# Patient Record
Sex: FEMALE | Race: WHITE | NOT HISPANIC OR LATINO | Employment: FULL TIME | URBAN - METROPOLITAN AREA
[De-identification: names, ages, dates, MRNs, and addresses within clinical notes are randomized per-mention and may not be internally consistent; named-entity substitution may affect disease eponyms.]

---

## 2017-03-10 ENCOUNTER — HOSPITAL ENCOUNTER (OUTPATIENT)
Dept: ORTHOPEDIC SURGERY | Facility: CLINIC | Age: 57
Discharge: HOME | End: 2017-03-10
Attending: PHYSICIAN ASSISTANT | Admitting: PHYSICIAN ASSISTANT

## 2017-03-15 ENCOUNTER — HOSPITAL ENCOUNTER (OUTPATIENT)
Dept: PHYSICAL THERAPY | Facility: HOSPITAL | Age: 57
Setting detail: RECURRING SERIES
Discharge: HOME | End: 2017-04-18
Attending: PHYSICIAN ASSISTANT | Admitting: PHYSICIAN ASSISTANT

## 2018-05-04 ENCOUNTER — HOSPITAL ENCOUNTER (OUTPATIENT)
Dept: MAMMOGRAPHY | Facility: HOSPITAL | Age: 58
Discharge: HOME | End: 2018-05-04
Attending: INTERNAL MEDICINE | Admitting: INTERNAL MEDICINE

## 2020-01-10 ENCOUNTER — TRANSCRIBE ORDERS (OUTPATIENT)
Dept: ADMINISTRATIVE | Facility: HOSPITAL | Age: 60
End: 2020-01-10

## 2020-01-10 DIAGNOSIS — Z12.39 SCREENING BREAST EXAMINATION: Primary | ICD-10-CM

## 2020-01-24 ENCOUNTER — APPOINTMENT (OUTPATIENT)
Dept: MAMMOGRAPHY | Facility: HOSPITAL | Age: 60
End: 2020-01-24

## 2020-01-28 ENCOUNTER — OFFICE VISIT (OUTPATIENT)
Dept: SLEEP MEDICINE | Facility: CLINIC | Age: 60
End: 2020-01-28

## 2020-01-28 VITALS
SYSTOLIC BLOOD PRESSURE: 122 MMHG | OXYGEN SATURATION: 99 % | HEART RATE: 76 BPM | WEIGHT: 190 LBS | DIASTOLIC BLOOD PRESSURE: 89 MMHG | HEIGHT: 68 IN | BODY MASS INDEX: 28.79 KG/M2

## 2020-01-28 DIAGNOSIS — R06.83 SNORING: ICD-10-CM

## 2020-01-28 DIAGNOSIS — G47.30 OBSERVED SLEEP APNEA: Primary | ICD-10-CM

## 2020-01-28 DIAGNOSIS — G47.10 HYPERSOMNIA: ICD-10-CM

## 2020-01-28 PROCEDURE — G0463 HOSPITAL OUTPT CLINIC VISIT: HCPCS

## 2020-01-28 PROCEDURE — 99244 OFF/OP CNSLTJ NEW/EST MOD 40: CPT | Performed by: INTERNAL MEDICINE

## 2020-01-28 NOTE — PROGRESS NOTES
[facility]  [address], [place] [address]  Phone   Fax       Michelle Aparicio  1960  59 y.o.  female      Referring Provider and PCP Nery Whitten MD    Type of service: Initial consult  Date of service: 1/28/2020      Chief Complaint   Patient presents with   • Witnessed Apnea   • Snoring   • Daytime Sleepiness   • Fatigue       History of present illness;  Thank you for asking to see Michelle Aparicio, 59 y.o.  for evaluation of sleep apnea. The patient was seen today on 1/28/2020 at [facility].   He was in his senses marrow and feels tired and exhausted.  At work she feels sleepy and in the meetings is very hard for her to be awake.  She is tired and exhausted.    Patient gives the following sleep history.  Sleep schedule:  Bedtime: 8:30 PM  Wake time: 3 AM  Normally takes about 10 minutes to fall asleep  Average hours of sleep 6  Number of naps per day yes  When patient wakes up still feels tired and not rested  Snoring; yes  Witnessed apneas; yes  Have you ever awakened gasping for breath, coughing, choking: Yes      Past Medical History:   Diagnosis Date   • Back pain 03/10/2017    low-area   • Disease of thyroid gland    • Fibromyalgia    • Lumbar and sacral spondylarthritis 03/10/2017    acquired    • Spondylosis 03/10/2017    lumbar with radiculopathy       Social history:  Shift work: No  Tobacco use: She does vaping  Alcohol use: No  Caffeinated drinks: 1-2  Over-the-counter sleeping aid and medications: No  Narcotic medications: No    Family Hx  Family history of sleep apnea, no  Family History   Problem Relation Age of Onset   • Deep vein thrombosis Mother    • Snoring Mother    • Heart disease Father    • Restless legs syndrome Father    • Snoring Father        Medications: reviewed    Current Outpatient Medications:   •  levothyroxine (SYNTHROID, LEVOTHROID) 88 MCG tablet, Take 88 mcg by mouth Daily. 1 po qd, Disp: , Rfl:   •   "diclofenac-misoprostol (ARTHROTEC 75) 75-0.2 MG EC tablet, Take 1 tablet by mouth 2 (Two) Times a Day. Delayed Release, Disp: , Rfl:   •  Naproxen-Esomeprazole (VIMOVO) 500-20 MG tablet delayed-release, Take  by mouth 2 (Two) Times a Day., Disp: , Rfl:     Review of systems:  [place] Sleepiness Scale: Total score: 18   Positive for snoring, witnessed apneas, fatigue and daytime excessive sleepiness,   Negative for shortness of breath, cough, wheezing, chest pain, nausea and vomiting, palpitation, swelling of feet:    Morning headaches: Yes  Awaken with sore throat or dry mouth : Yes  Leg jerking at night: No  Crawly feeling/urge sensation to move in the legs: No  Teeth grinding: No  Sleepwalking, nightmares, muscle weakness with laughing or anger,sleep paralysis: No  Nasal Congestion: No  Nocturia (how many times/night): No  Memory Problem: No  Change in weight, no    Physical exam:  Vitals:    01/28/20 0931   BP: 122/89   Pulse: 76   SpO2: 99%   Weight: 86.2 kg (190 lb)   Height: 172.7 cm (68\")    Body mass index is 28.89 kg/m². Neck Circumference: 14 inches  HEENT: Head is atraumatic, normocephalic   Eyes:pupils are round equal and reacting to light and accommodation, conjunctiva normal  Nose:no nasal septal defects or deviation and the nasal passages are clear, no nasal polyps,   Throat: tonsils are not enlarged, tongue normal size, oral airway Mallampati class 3  NECK: No lymphadenopathy, trachea is in the midline, thyroid not enlarged  RESPIRATORY SYSTEM: Breath sounds are equal on both sides, there are no wheezes or crackles  CARDIOVASULAR SYSTEM: Heart sounds are regular rhythm and jose rate, there are no murmurs or thrills  ABDOMEN: Soft, no hepatosplenomegaly, no evidence of ascites  EXTREMITES: No cyanosis, clubbing or edema   NEUROLOGICAL SYSTEM: Oriented x 3, no gross neurological defects, gait normal    Medical records and labs reviewed in Epic thyroid function normal    Assessment and plan:  · Sleep " apnea unspecified, (G47.30) Patient's symptoms and examination is consistent with sleep apnea.  I have talked to the patient about the signs and symptoms of sleep apnea and consequences of untreated sleep apnea. Discussed sleep testing.  I have placed a order in epic for a home sleep test.  Patient will have a follow-up after this sleep test is done.   · Overweight, patient's BMI is Body mass index is 28.89 kg/m².. I have discussed the relationship between weight and sleep apnea.There is direct correction between weight and severity of sleep apnea.  Weight reduction is encouraged. I have also discussed with the patient diet and exercise.  · Snoring secondary to sleep apnea  · Hypersomnia with [place] Sleepiness Scale of Total score: 18 due to sleep apnea.  · Insufficient sleep syndrome.  I feel that she is not getting enough sleep and average of 6 hours per night.  I have talked to the patient about increasing sleep time.      I once again thank you for asking me to see this patient in consultation and I have forwarded my opinion and treatment plan.  Please do not hesitate to call me if you have any questions.     Roger Caldwell MD, [facility]  Sleep Medicine.(Board-certified)  [facility]  Medical Director for [facility]  1/28/2020 ,

## 2020-02-28 ENCOUNTER — HOSPITAL ENCOUNTER (OUTPATIENT)
Dept: SLEEP MEDICINE | Facility: HOSPITAL | Age: 60
Discharge: HOME | End: 2020-02-28

## 2020-02-28 VITALS — HEART RATE: 77 BPM | RESPIRATION RATE: 14 BRPM | OXYGEN SATURATION: 93 %

## 2020-02-28 DIAGNOSIS — R06.83 SNORING: ICD-10-CM

## 2020-02-28 DIAGNOSIS — G47.30 OBSERVED SLEEP APNEA: ICD-10-CM

## 2020-02-28 DIAGNOSIS — G47.10 HYPERSOMNIA: ICD-10-CM

## 2020-02-28 PROCEDURE — 95806 SLEEP STUDY UNATT&RESP EFFT: CPT

## 2020-03-03 DIAGNOSIS — G47.10 HYPERSOMNIA: ICD-10-CM

## 2020-03-03 DIAGNOSIS — R06.83 SNORING: ICD-10-CM

## 2020-03-03 DIAGNOSIS — G47.30 OBSERVED SLEEP APNEA: Primary | ICD-10-CM

## 2020-05-15 ENCOUNTER — HOSPITAL ENCOUNTER (OUTPATIENT)
Dept: SLEEP MEDICINE | Facility: HOSPITAL | Age: 60
Discharge: HOME | End: 2020-05-15
Admitting: INTERNAL MEDICINE

## 2020-05-15 VITALS — BODY MASS INDEX: 28.8 KG/M2 | WEIGHT: 190.04 LBS | HEIGHT: 68 IN

## 2020-05-15 DIAGNOSIS — R06.83 SNORING: ICD-10-CM

## 2020-05-15 DIAGNOSIS — G47.30 OBSERVED SLEEP APNEA: ICD-10-CM

## 2020-05-15 DIAGNOSIS — G47.10 HYPERSOMNIA: ICD-10-CM

## 2020-05-15 PROCEDURE — 95810 POLYSOM 6/> YRS 4/> PARAM: CPT | Performed by: INTERNAL MEDICINE

## 2020-05-15 PROCEDURE — 95810 POLYSOM 6/> YRS 4/> PARAM: CPT

## 2020-05-19 DIAGNOSIS — G47.33 OSA (OBSTRUCTIVE SLEEP APNEA): Primary | ICD-10-CM

## 2020-05-19 DIAGNOSIS — R06.83 SNORING: ICD-10-CM

## 2020-09-19 ENCOUNTER — HOSPITAL ENCOUNTER (EMERGENCY)
Facility: HOSPITAL | Age: 60
Discharge: HOME | End: 2020-09-19
Attending: EMERGENCY MEDICINE | Admitting: EMERGENCY MEDICINE

## 2020-09-19 ENCOUNTER — APPOINTMENT (OUTPATIENT)
Dept: GENERAL RADIOLOGY | Facility: HOSPITAL | Age: 60
End: 2020-09-19

## 2020-09-19 VITALS
SYSTOLIC BLOOD PRESSURE: 119 MMHG | WEIGHT: 201.06 LBS | RESPIRATION RATE: 18 BRPM | TEMPERATURE: 100.3 F | BODY MASS INDEX: 30.47 KG/M2 | HEART RATE: 96 BPM | HEIGHT: 68 IN | DIASTOLIC BLOOD PRESSURE: 74 MMHG | OXYGEN SATURATION: 97 %

## 2020-09-19 DIAGNOSIS — R06.00 DYSPNEA, UNSPECIFIED TYPE: ICD-10-CM

## 2020-09-19 DIAGNOSIS — U07.1 COVID-19 VIRUS INFECTION: Primary | ICD-10-CM

## 2020-09-19 LAB
ANION GAP SERPL CALCULATED.3IONS-SCNC: 9 MMOL/L (ref 5–15)
APTT PPP: 27.1 SECONDS (ref 24–31)
BASOPHILS # BLD AUTO: 0 10*3/MM3 (ref 0–0.2)
BASOPHILS NFR BLD AUTO: 0.5 % (ref 0–1.5)
BUN SERPL-MCNC: 15 MG/DL (ref 6–20)
BUN SERPL-MCNC: ABNORMAL MG/DL
BUN/CREAT SERPL: ABNORMAL
CALCIUM SPEC-SCNC: 8.8 MG/DL (ref 8.6–10.5)
CHLORIDE SERPL-SCNC: 102 MMOL/L (ref 98–107)
CO2 SERPL-SCNC: 22 MMOL/L (ref 22–29)
CREAT SERPL-MCNC: 1.2 MG/DL (ref 0.57–1)
DEPRECATED RDW RBC AUTO: 47.3 FL (ref 37–54)
EOSINOPHIL # BLD AUTO: 0 10*3/MM3 (ref 0–0.4)
EOSINOPHIL NFR BLD AUTO: 0.3 % (ref 0.3–6.2)
ERYTHROCYTE [DISTWIDTH] IN BLOOD BY AUTOMATED COUNT: 14.7 % (ref 12.3–15.4)
GFR SERPL CREATININE-BSD FRML MDRD: 46 ML/MIN/1.73
GLUCOSE SERPL-MCNC: 111 MG/DL (ref 65–99)
HCT VFR BLD AUTO: 42.2 % (ref 34–46.6)
HGB BLD-MCNC: 13.7 G/DL (ref 12–15.9)
INR PPP: <0.93 (ref 0.93–1.1)
LYMPHOCYTES # BLD AUTO: 1.7 10*3/MM3 (ref 0.7–3.1)
LYMPHOCYTES NFR BLD AUTO: 29.8 % (ref 19.6–45.3)
MCH RBC QN AUTO: 29.5 PG (ref 26.6–33)
MCHC RBC AUTO-ENTMCNC: 32.5 G/DL (ref 31.5–35.7)
MCV RBC AUTO: 90.7 FL (ref 79–97)
MONOCYTES # BLD AUTO: 0.4 10*3/MM3 (ref 0.1–0.9)
MONOCYTES NFR BLD AUTO: 6.5 % (ref 5–12)
NEUTROPHILS NFR BLD AUTO: 3.6 10*3/MM3 (ref 1.7–7)
NEUTROPHILS NFR BLD AUTO: 62.9 % (ref 42.7–76)
NRBC BLD AUTO-RTO: 0 /100 WBC (ref 0–0.2)
NT-PROBNP SERPL-MCNC: 89.4 PG/ML (ref 0–900)
PLATELET # BLD AUTO: 136 10*3/MM3 (ref 140–450)
PMV BLD AUTO: 7.1 FL (ref 6–12)
POTASSIUM SERPL-SCNC: 4.5 MMOL/L (ref 3.5–5.2)
PROTHROMBIN TIME: 9.8 SECONDS (ref 9.6–11.7)
RBC # BLD AUTO: 4.66 10*6/MM3 (ref 3.77–5.28)
SARS-COV-2 RNA PNL SPEC NAA+PROBE: DETECTED
SODIUM SERPL-SCNC: 133 MMOL/L (ref 136–145)
TROPONIN T SERPL-MCNC: <0.01 NG/ML (ref 0–0.03)
WBC # BLD AUTO: 5.7 10*3/MM3 (ref 3.4–10.8)

## 2020-09-19 PROCEDURE — 85025 COMPLETE CBC W/AUTO DIFF WBC: CPT | Performed by: EMERGENCY MEDICINE

## 2020-09-19 PROCEDURE — 71045 X-RAY EXAM CHEST 1 VIEW: CPT

## 2020-09-19 PROCEDURE — 99284 EMERGENCY DEPT VISIT MOD MDM: CPT

## 2020-09-19 PROCEDURE — 83880 ASSAY OF NATRIURETIC PEPTIDE: CPT | Performed by: EMERGENCY MEDICINE

## 2020-09-19 PROCEDURE — 87635 SARS-COV-2 COVID-19 AMP PRB: CPT | Performed by: EMERGENCY MEDICINE

## 2020-09-19 PROCEDURE — 80048 BASIC METABOLIC PNL TOTAL CA: CPT | Performed by: EMERGENCY MEDICINE

## 2020-09-19 PROCEDURE — 84484 ASSAY OF TROPONIN QUANT: CPT | Performed by: EMERGENCY MEDICINE

## 2020-09-19 PROCEDURE — 93005 ELECTROCARDIOGRAM TRACING: CPT | Performed by: EMERGENCY MEDICINE

## 2020-09-19 PROCEDURE — 36415 COLL VENOUS BLD VENIPUNCTURE: CPT

## 2020-09-19 PROCEDURE — 85730 THROMBOPLASTIN TIME PARTIAL: CPT | Performed by: EMERGENCY MEDICINE

## 2020-09-19 PROCEDURE — 85610 PROTHROMBIN TIME: CPT | Performed by: EMERGENCY MEDICINE

## 2020-09-19 RX ORDER — SODIUM CHLORIDE 0.9 % (FLUSH) 0.9 %
10 SYRINGE (ML) INJECTION AS NEEDED
Status: DISCONTINUED | OUTPATIENT
Start: 2020-09-19 | End: 2020-09-19 | Stop reason: HOSPADM

## 2020-09-19 NOTE — ED NOTES
Pt c/o SOA, dizziness, hot/cold chills, mild cough x2 days; states parents and sibling have covid.     Ana Martinez RN  09/19/20 4790

## 2020-09-19 NOTE — ED PROVIDER NOTES
Subjective   Chief complaint: Shortness of breath    60-year-old female presents with shortness of breath.  Patient states symptoms have been getting progressively worse over the past 2 days.  She has had subjective fevers and chills.  She has had a nonproductive cough.  She has had general weakness and fatigue as well as body aches.  She denies any alleviating or exacerbating factors.  She has had multiple family members who have tested positive for COVID.      History provided by:  Patient      Review of Systems   Constitutional: Positive for chills, fatigue and fever.   HENT: Positive for congestion. Negative for sore throat.    Eyes: Negative for redness.   Respiratory: Positive for cough and shortness of breath.    Cardiovascular: Negative for chest pain.   Gastrointestinal: Positive for diarrhea and nausea. Negative for abdominal pain and vomiting.   Genitourinary: Negative for dysuria.   Musculoskeletal: Negative for back pain.   Skin: Negative for rash.   Neurological: Negative for dizziness and headaches.   Psychiatric/Behavioral: Negative for confusion.       Past Medical History:   Diagnosis Date   • Back pain 03/10/2017    low-area   • Disease of thyroid gland    • Fibromyalgia    • Lumbar and sacral spondylarthritis 03/10/2017    acquired    • Spondylosis 03/10/2017    lumbar with radiculopathy       Allergies   Allergen Reactions   • Stadol [Butorphanol] Hives       Past Surgical History:   Procedure Laterality Date   •  SECTION     • GALLBLADDER SURGERY     • HYSTERECTOMY     • LAPAROSCOPIC GASTRIC BANDING     • STOMACH SURGERY      Tummy Tuck   • THYROID SURGERY         Family History   Problem Relation Age of Onset   • Deep vein thrombosis Mother    • Snoring Mother    • Heart disease Father    • Restless legs syndrome Father    • Snoring Father        Social History     Socioeconomic History   • Marital status: Single     Spouse name: Not on file   • Number of  "children: Not on file   • Years of education: Not on file   • Highest education level: Not on file       /74 (BP Location: Right arm, Patient Position: Lying)   Pulse 96   Temp 100.3 °F (37.9 °C) (Oral)   Resp 18   Ht 172.7 cm (68\")   Wt 91.2 kg (201 lb 1 oz)   SpO2 97%   BMI 30.57 kg/m²       Objective   Physical Exam  Constitutional:       Appearance: She is well-developed.   HENT:      Head: Normocephalic and atraumatic.   Eyes:      Extraocular Movements: Extraocular movements intact.      Pupils: Pupils are equal, round, and reactive to light.   Neck:      Musculoskeletal: Normal range of motion and neck supple.   Cardiovascular:      Rate and Rhythm: Normal rate and regular rhythm.      Heart sounds: Normal heart sounds.   Pulmonary:      Effort: Pulmonary effort is normal. No respiratory distress.      Breath sounds: Normal breath sounds.   Abdominal:      General: Bowel sounds are normal.      Palpations: Abdomen is soft.      Tenderness: There is no abdominal tenderness.   Musculoskeletal: Normal range of motion.   Skin:     General: Skin is warm and dry.   Neurological:      General: No focal deficit present.      Mental Status: She is alert and oriented to person, place, and time.         Procedures           ED Course      My interpretation of EKG shows sinus rhythm, rate of 82, no ST elevation.       Results for orders placed or performed during the hospital encounter of 09/19/20   COVID-19,[facility] IN-HOUSE,Nasal Swab No Transport Media 3-4 HR TAT - Swab, Nasal Cavity    Specimen: Nasal Cavity; Swab   Result Value Ref Range    COVID19 Detected (C) Not Detected - Ref. Range   Basic Metabolic Panel    Specimen: Hand, Right; Blood   Result Value Ref Range    Glucose 111 (H) 65 - 99 mg/dL    BUN      Creatinine 1.20 (H) 0.57 - 1.00 mg/dL    Sodium 133 (L) 136 - 145 mmol/L    Potassium 4.5 3.5 - 5.2 mmol/L    Chloride 102 98 - 107 mmol/L    CO2 22.0 22.0 - 29.0 mmol/L    Calcium 8.8 8.6 - 10.5 " mg/dL    eGFR Non African Amer 46 (L) >60 mL/min/1.73    BUN/Creatinine Ratio      Anion Gap 9.0 5.0 - 15.0 mmol/L   Protime-INR    Specimen: Hand, Right; Blood   Result Value Ref Range    Protime 9.8 9.6 - 11.7 Seconds    INR <0.93 (L) 0.93 - 1.10   aPTT    Specimen: Hand, Right; Blood   Result Value Ref Range    PTT 27.1 24.0 - 31.0 seconds   Troponin    Specimen: Hand, Right; Blood   Result Value Ref Range    Troponin T <0.010 0.000 - 0.030 ng/mL   BNP    Specimen: Hand, Right; Blood   Result Value Ref Range    proBNP 89.4 0.0 - 900.0 pg/mL   CBC Auto Differential    Specimen: Hand, Right; Blood   Result Value Ref Range    WBC 5.70 3.40 - 10.80 10*3/mm3    RBC 4.66 3.77 - 5.28 10*6/mm3    Hemoglobin 13.7 12.0 - 15.9 g/dL    Hematocrit 42.2 34.0 - 46.6 %    MCV 90.7 79.0 - 97.0 fL    MCH 29.5 26.6 - 33.0 pg    MCHC 32.5 31.5 - 35.7 g/dL    RDW 14.7 12.3 - 15.4 %    RDW-SD 47.3 37.0 - 54.0 fl    MPV 7.1 6.0 - 12.0 fL    Platelets 136 (L) 140 - 450 10*3/mm3    Neutrophil % 62.9 42.7 - 76.0 %    Lymphocyte % 29.8 19.6 - 45.3 %    Monocyte % 6.5 5.0 - 12.0 %    Eosinophil % 0.3 0.3 - 6.2 %    Basophil % 0.5 0.0 - 1.5 %    Neutrophils, Absolute 3.60 1.70 - 7.00 10*3/mm3    Lymphocytes, Absolute 1.70 0.70 - 3.10 10*3/mm3    Monocytes, Absolute 0.40 0.10 - 0.90 10*3/mm3    Eosinophils, Absolute 0.00 0.00 - 0.40 10*3/mm3    Basophils, Absolute 0.00 0.00 - 0.20 10*3/mm3    nRBC 0.0 0.0 - 0.2 /100 WBC   BUN    Specimen: Hand, Right; Blood   Result Value Ref Range    BUN 15 6 - 20 mg/dL     Xr Chest 1 View    Result Date: 9/19/2020  No acute process.  Electronically Signed By-Gualberto Elmore On:9/19/2020 8:03 PM This report was finalized on [id number] by  Gualberto Elmore, .                                    MDM   Patient had the above evaluation.  Results were discussed with the patient.  Patient was found to be positive for COVID-19.  Work-up was otherwise fairly unremarkable.  Chest x-ray showed no acute disease.  EKG showed  no acute ischemia.  Troponin is negative.  BNP is normal.  Patient is oxygenating well on room air.  I see no indication for admission at this time.  Patient will be discharged to isolate at home.  She was given warning signs to return.      Final diagnoses:   COVID-19 virus infection   Dyspnea, unspecified type            Bill García MD  09/19/20 2054

## 2020-09-20 NOTE — DISCHARGE INSTRUCTIONS
Follow-up with your primary doctor.  Return to the emergency room for any new or worsening symptoms or if you have any other questions or concerns.  Isolate at home until at least 10 days after the start of your symptoms, until you are fever free for 24 hours without using fever reducing medicine, and until your symptoms are improving.

## 2020-09-21 ENCOUNTER — EPISODE CHANGES (OUTPATIENT)
Dept: CASE MANAGEMENT | Facility: OTHER | Age: 60
End: 2020-09-21

## 2020-09-21 ENCOUNTER — PATIENT OUTREACH (OUTPATIENT)
Dept: CASE MANAGEMENT | Facility: OTHER | Age: 60
End: 2020-09-21

## 2020-09-21 NOTE — OUTREACH NOTE
Care Coordination Note    Called pt's PCP office, notified staff that pt was seen at [facility] ED on 9/19/20, tested positive for covid. PCP office states she is aware, they spoke with the pt, thanks RN-ACHELEN for calling.     Gadiel Clarke RN  Ambulatory     9/21/2020, 11:24 EDT

## 2020-09-21 NOTE — OUTREACH NOTE
ED Potential Covid Discharge Follow-up    Pt discharged from [facility] ED on 9/19/20, seen for SOA, fever, chills, cough, covid 19 testing performed, covid positive. RN-ACM outreach call made to pt. Pt reports symptoms are unchanged. She c/o SOA with activity, fever off and on, subjective, cough, chills. She denies CP. Reviewed ED AVS with pt. Education provided. RN-ACM emphasized importance of monitoring symptoms and when to seek medical attention. Pt denies food, medication, or transportation insecurities. Discussed follow up. Pt states she has a PCP follow up appt scheduled on 10/5/20 and she spoke with her PCP office this am. No questions or concerns per pt at this time. [facility] 24 hour nurse line and covid hotline numbers provided. Advised pt to call with any needs. Follow up outreach as needed.     Gadiel Clarke RN  Ambulatory     9/21/2020, 11:01 EDT

## 2021-01-26 ENCOUNTER — TRANSCRIBE ORDERS (OUTPATIENT)
Dept: ADMINISTRATIVE | Facility: HOSPITAL | Age: 61
End: 2021-01-26

## 2021-01-26 DIAGNOSIS — M54.2 NECK PAIN: Primary | ICD-10-CM

## 2021-02-01 ENCOUNTER — APPOINTMENT (OUTPATIENT)
Dept: MRI IMAGING | Facility: HOSPITAL | Age: 61
End: 2021-02-01

## 2021-02-08 ENCOUNTER — APPOINTMENT (OUTPATIENT)
Dept: MRI IMAGING | Facility: HOSPITAL | Age: 61
End: 2021-02-08

## 2021-02-09 ENCOUNTER — HOSPITAL ENCOUNTER (OUTPATIENT)
Dept: MRI IMAGING | Facility: HOSPITAL | Age: 61
Discharge: HOME | End: 2021-02-09
Admitting: INTERNAL MEDICINE

## 2021-02-09 DIAGNOSIS — M54.2 NECK PAIN: ICD-10-CM

## 2021-02-09 PROCEDURE — 72141 MRI NECK SPINE W/O DYE: CPT

## 2021-05-20 ENCOUNTER — TRANSCRIBE ORDERS (OUTPATIENT)
Dept: ADMINISTRATIVE | Facility: HOSPITAL | Age: 61
End: 2021-05-20

## 2021-05-20 DIAGNOSIS — Z12.31 VISIT FOR SCREENING MAMMOGRAM: Primary | ICD-10-CM

## 2021-05-28 ENCOUNTER — HOSPITAL ENCOUNTER (OUTPATIENT)
Dept: MAMMOGRAPHY | Facility: HOSPITAL | Age: 61
Discharge: HOME | End: 2021-05-28
Admitting: INTERNAL MEDICINE

## 2021-05-28 DIAGNOSIS — Z12.31 VISIT FOR SCREENING MAMMOGRAM: ICD-10-CM

## 2021-05-28 PROCEDURE — 77067 SCR MAMMO BI INCL CAD: CPT

## 2021-05-28 PROCEDURE — 77063 BREAST TOMOSYNTHESIS BI: CPT

## 2021-07-15 ENCOUNTER — HOSPITAL ENCOUNTER (EMERGENCY)
Facility: HOSPITAL | Age: 61
Discharge: HOME | End: 2021-07-15
Attending: EMERGENCY MEDICINE | Admitting: EMERGENCY MEDICINE

## 2021-07-15 ENCOUNTER — APPOINTMENT (OUTPATIENT)
Dept: GENERAL RADIOLOGY | Facility: HOSPITAL | Age: 61
End: 2021-07-15

## 2021-07-15 VITALS
BODY MASS INDEX: 28.79 KG/M2 | OXYGEN SATURATION: 93 % | WEIGHT: 190 LBS | HEART RATE: 70 BPM | RESPIRATION RATE: 13 BRPM | SYSTOLIC BLOOD PRESSURE: 104 MMHG | TEMPERATURE: 98 F | HEIGHT: 68 IN | DIASTOLIC BLOOD PRESSURE: 70 MMHG

## 2021-07-15 DIAGNOSIS — R07.9 CHEST PAIN, UNSPECIFIED TYPE: Primary | ICD-10-CM

## 2021-07-15 LAB
ANION GAP SERPL CALCULATED.3IONS-SCNC: 16 MMOL/L (ref 5–15)
BASOPHILS # BLD AUTO: 0.1 10*3/MM3 (ref 0–0.2)
BASOPHILS NFR BLD AUTO: 1.1 % (ref 0–1.5)
BUN SERPL-MCNC: 11 MG/DL (ref 8–23)
BUN/CREAT SERPL: 11.6 (ref 7–25)
CALCIUM SPEC-SCNC: 9.3 MG/DL (ref 8.6–10.5)
CHLORIDE SERPL-SCNC: 100 MMOL/L (ref 98–107)
CO2 SERPL-SCNC: 19 MMOL/L (ref 22–29)
CREAT SERPL-MCNC: 0.95 MG/DL (ref 0.57–1)
DEPRECATED RDW RBC AUTO: 48.6 FL (ref 37–54)
EOSINOPHIL # BLD AUTO: 0.3 10*3/MM3 (ref 0–0.4)
EOSINOPHIL NFR BLD AUTO: 2.5 % (ref 0.3–6.2)
ERYTHROCYTE [DISTWIDTH] IN BLOOD BY AUTOMATED COUNT: 14.9 % (ref 12.3–15.4)
GFR SERPL CREATININE-BSD FRML MDRD: 60 ML/MIN/1.73
GLUCOSE SERPL-MCNC: 85 MG/DL (ref 65–99)
HCT VFR BLD AUTO: 45.5 % (ref 34–46.6)
HGB BLD-MCNC: 15 G/DL (ref 12–15.9)
HOLD SPECIMEN: NORMAL
LYMPHOCYTES # BLD AUTO: 4.9 10*3/MM3 (ref 0.7–3.1)
LYMPHOCYTES NFR BLD AUTO: 44.2 % (ref 19.6–45.3)
MCH RBC QN AUTO: 30.8 PG (ref 26.6–33)
MCHC RBC AUTO-ENTMCNC: 33 G/DL (ref 31.5–35.7)
MCV RBC AUTO: 93.6 FL (ref 79–97)
MONOCYTES # BLD AUTO: 0.9 10*3/MM3 (ref 0.1–0.9)
MONOCYTES NFR BLD AUTO: 7.7 % (ref 5–12)
NEUTROPHILS NFR BLD AUTO: 4.9 10*3/MM3 (ref 1.7–7)
NEUTROPHILS NFR BLD AUTO: 44.5 % (ref 42.7–76)
NRBC BLD AUTO-RTO: 0.1 /100 WBC (ref 0–0.2)
PLATELET # BLD AUTO: 322 10*3/MM3 (ref 140–450)
PMV BLD AUTO: 6.9 FL (ref 6–12)
POTASSIUM SERPL-SCNC: 4 MMOL/L (ref 3.5–5.2)
RBC # BLD AUTO: 4.86 10*6/MM3 (ref 3.77–5.28)
SODIUM SERPL-SCNC: 135 MMOL/L (ref 136–145)
TROPONIN T SERPL-MCNC: <0.01 NG/ML (ref 0–0.03)
WBC # BLD AUTO: 11.1 10*3/MM3 (ref 3.4–10.8)

## 2021-07-15 PROCEDURE — 80048 BASIC METABOLIC PNL TOTAL CA: CPT | Performed by: EMERGENCY MEDICINE

## 2021-07-15 PROCEDURE — 84484 ASSAY OF TROPONIN QUANT: CPT | Performed by: EMERGENCY MEDICINE

## 2021-07-15 PROCEDURE — 93005 ELECTROCARDIOGRAM TRACING: CPT | Performed by: EMERGENCY MEDICINE

## 2021-07-15 PROCEDURE — 85025 COMPLETE CBC W/AUTO DIFF WBC: CPT | Performed by: EMERGENCY MEDICINE

## 2021-07-15 PROCEDURE — 99283 EMERGENCY DEPT VISIT LOW MDM: CPT

## 2021-07-15 PROCEDURE — 93005 ELECTROCARDIOGRAM TRACING: CPT

## 2021-07-15 PROCEDURE — 71045 X-RAY EXAM CHEST 1 VIEW: CPT

## 2021-07-15 RX ORDER — SODIUM CHLORIDE 0.9 % (FLUSH) 0.9 %
10 SYRINGE (ML) INJECTION AS NEEDED
Status: DISCONTINUED | OUTPATIENT
Start: 2021-07-15 | End: 2021-07-15 | Stop reason: HOSPADM

## 2021-07-15 NOTE — ED NOTES
PT reports sudden onset chest pain and SOA at 0500 this am that has been increasingly getting worse       Huralize, Shira BOYCE RN  07/15/21 0538

## 2021-07-15 NOTE — ED PROVIDER NOTES
Subjective   Patient is a 61-year-old female complaint of sharp pain in her chest intermittently since yesterday.  States pain is mild lasting seconds at a time.  There is no radiation of pain.  Denies cough fever shortness of breath or other complaint.  She states been pain-free for the past several hours.          Review of Systems  Negative for headache earache sore throat cough fever shortness of breath abdominal pain vomiting diarrhea dysuria achiness weight loss or other complaint.  A complete view systems was obtained and is otherwise negative  Past Medical History:   Diagnosis Date   • Back pain 03/10/2017    low-area   • Disease of thyroid gland    • Fibromyalgia    • Lumbar and sacral spondylarthritis 03/10/2017    acquired    • Spondylosis 03/10/2017    lumbar with radiculopathy       Allergies   Allergen Reactions   • Stadol [Butorphanol] Hives       Past Surgical History:   Procedure Laterality Date   •  SECTION     • GALLBLADDER SURGERY     • HYSTERECTOMY     • LAPAROSCOPIC GASTRIC BANDING     • STOMACH SURGERY      Tummy Tuck   • THYROID SURGERY         Family History   Problem Relation Age of Onset   • Deep vein thrombosis Mother    • Snoring Mother    • Breast cancer Mother    • Heart disease Father    • Restless legs syndrome Father    • Snoring Father    • Breast cancer Sister    • Breast cancer Maternal Aunt        Social History     Socioeconomic History   • Marital status: Single     Spouse name: Not on file   • Number of children: Not on file   • Years of education: Not on file   • Highest education level: Not on file           Objective   Physical Exam  HEENT exam shows TMs to be clear.  Oropharynx comers but sclerae nonicteric.  Neck has no adenopathy JVD or bruits.  Lungs are clear.  Heart has regular rate rhythm without murmur gallop.  Chest is nontender.  Abdomen is soft nontender.  Extremity exam is no cyanosis or edema.  Procedures     My EKG  interpretation shows normal sinus rhythm with no acute ST change      ED Course      .tis  XR Chest 1 View    Result Date: 7/15/2021  Negative portable chest, no active disease.  Electronically Signed By-Jr Montes MD On:7/15/2021 6:58 AM This report was finalized on [id number] by  Jr Montes MD.                                         MDM  Number of Diagnoses or Management Options  Diagnosis management comments: Patient has a benign physical exam.  There is no evidence of acute understanding based on EKG and troponin.  Metabolic panel is normal.  There is no evidence of infectious process.  Patient was pain-free throughout her ED visit.  Patient will be discharged.  She is instructed follow-up family physician for further outpatient evaluation as needed.       Amount and/or Complexity of Data Reviewed  Clinical lab tests: reviewed  Tests in the radiology section of CPT®: reviewed  Tests in the medicine section of CPT®: reviewed    Risk of Complications, Morbidity, and/or Mortality  Presenting problems: high  Diagnostic procedures: high  Management options: high    Patient Progress  Patient progress: stable      Final diagnoses:   Chest pain, unspecified type       ED Disposition  ED Disposition     ED Disposition Condition Comment    Discharge Stable           No follow-up provider specified.       Medication List      No changes were made to your prescriptions during this visit.          Mayco Quinteros MD  07/15/21 0718

## 2021-07-16 LAB — QT INTERVAL: 357 MS

## 2024-08-17 ENCOUNTER — HOSPITAL ENCOUNTER (EMERGENCY)
Facility: HOSPITAL | Age: 64
Discharge: HOME | End: 2024-08-17
Attending: EMERGENCY MEDICINE
Payer: COMMERCIAL

## 2024-08-17 ENCOUNTER — APPOINTMENT (OUTPATIENT)
Dept: GENERAL RADIOLOGY | Facility: HOSPITAL | Age: 64
End: 2024-08-17
Payer: COMMERCIAL

## 2024-08-17 VITALS
TEMPERATURE: 97.7 F | RESPIRATION RATE: 19 BRPM | HEIGHT: 68 IN | SYSTOLIC BLOOD PRESSURE: 160 MMHG | HEART RATE: 91 BPM | OXYGEN SATURATION: 97 % | DIASTOLIC BLOOD PRESSURE: 88 MMHG | BODY MASS INDEX: 28.8 KG/M2 | WEIGHT: 190.04 LBS

## 2024-08-17 DIAGNOSIS — S39.012A STRAIN OF LUMBAR REGION, INITIAL ENCOUNTER: Primary | ICD-10-CM

## 2024-08-17 PROCEDURE — 99283 EMERGENCY DEPT VISIT LOW MDM: CPT

## 2024-08-17 PROCEDURE — 72110 X-RAY EXAM L-2 SPINE 4/>VWS: CPT

## 2024-08-17 RX ORDER — HYDROCODONE BITARTRATE AND ACETAMINOPHEN 7.5; 325 MG/1; MG/1
1 TABLET ORAL ONCE
Status: COMPLETED | OUTPATIENT
Start: 2024-08-17 | End: 2024-08-17

## 2024-08-17 RX ORDER — METHOCARBAMOL 750 MG/1
750 TABLET, FILM COATED ORAL 3 TIMES DAILY PRN
Qty: 15 TABLET | Refills: 0 | Status: SHIPPED | OUTPATIENT
Start: 2024-08-17

## 2024-08-17 RX ADMIN — HYDROCODONE BITARTRATE AND ACETAMINOPHEN 1 TABLET: 7.5; 325 TABLET ORAL at 16:33

## 2024-08-17 NOTE — ED NOTES
"Pt stated she bent over to  a toy and felt a pop in her lower back and \"she fell out\" . No obvious deformities , obvious pain , - thinners and denies falling or hitting head  "

## 2024-08-17 NOTE — ED PROVIDER NOTES
"Subjective   History of Present Illness  64-year-old female complains of some low back pain that started when she bent over to  an object.  She states she felt a snap in her low back and has had some severe pain since then.  States it occurred about an hour prior to arrival.  She denies numbness or weakness in the extremities or bowel or bladder dysfunction or abdominal pain.  She has had history of intermittent back pain in the past.  Review of Systems    Past Medical History:   Diagnosis Date    Back pain 03/10/2017    low-area    Disease of thyroid gland     Fibromyalgia     Lumbar and sacral spondylarthritis 03/10/2017    acquired     Spondylosis 03/10/2017    lumbar with radiculopathy       Allergies   Allergen Reactions    Stadol [Butorphanol] Hives       Past Surgical History:   Procedure Laterality Date     SECTION      GALLBLADDER SURGERY      HYSTERECTOMY      LAPAROSCOPIC GASTRIC BANDING      STOMACH SURGERY      Tummy Tuck    THYROID SURGERY  1975       Family History   Problem Relation Age of Onset    Deep vein thrombosis Mother     Snoring Mother     Breast cancer Mother     Heart disease Father     Restless legs syndrome Father     Snoring Father     Breast cancer Sister     Breast cancer Maternal Aunt        Social History     Socioeconomic History    Marital status: Single       Prior to Admission medications    Medication Sig Start Date End Date Taking? Authorizing Provider   diclofenac-misoprostol (ARTHROTEC 75) 75-0.2 MG EC tablet Take 1 tablet by mouth 2 (Two) Times a Day. Delayed Release 17   Penelope Lennon MD   levothyroxine (SYNTHROID, LEVOTHROID) 88 MCG tablet Take 88 mcg by mouth Daily. 1 po qd 3/10/17   Brian Paredes PA   Naproxen-Esomeprazole (VIMOVO) 500-20 MG tablet delayed-release Take  by mouth 2 (Two) Times a Day. 3/10/17   Penelope Lennon MD     /90   Pulse 94   Temp 97.6 °F (36.4 °C)   Resp 18   Ht 172.7 cm (68\")   Wt " 86.2 kg (190 lb 0.6 oz)   SpO2 96%   BMI 28.89 kg/m²       Objective   Physical Exam  General: Well-appearing, no acute distress  Psych: Oriented, pleasant affect  Respirations: Clear, nonlabored respirations  Abdomen soft nontender nondistended  Back: There is some tenderness with patient lower lumbar region of the back, no step-off or crepitus, no soft tissue swelling or erythema; straight leg test negative, equal sensorimotor function and strength in the bilateral extremities and normal and equal deep tendon reflexes.  Equal normal pulses in the bilateral lower extremities  Skin: No rash, normal color  Procedures           ED Course      XR Spine Lumbar Complete 4+VW    Result Date: 8/17/2024  Impression: 1.6 nonrib-bearing lumbar vertebra 2.Multilevel degenerative change. Electronically Signed: Paulie Crain MD  8/17/2024 5:43 PM EDT  Workstation ID: [id number]                                          Medical Decision Making  Differential diagnosis including fracture, cauda equina syndrome, epidural abscess, discitis    Patient describes mechanical back strain, x-rays negative for acute bony injury or malalignment.  Has no signs of cord injury or infection.  She was ordered Norco in emergency room for discomfort.  She was resting more calmly on reexamination.  She was advised of findings she is agreeable plan of outpatient follow-up with the spine center.  She is prescribed Robaxin and was discharged good condition.    Amount and/or Complexity of Data Reviewed  Radiology: ordered and independent interpretation performed.     Details: My independent interpretation of x-ray image of the spine no apparent acute bony injury    Risk  Prescription drug management.        Final diagnoses:   Strain of lumbar region, initial encounter       ED Disposition  ED Disposition       ED Disposition   Discharge    Condition   Stable    Comment   --               Liborio Hobson MD  [address] [place]  [address] [phone]    Schedule an appointment as soon as possible for a visit in 1 week           Medication List        New Prescriptions      methocarbamol 750 MG tablet  Commonly known as: ROBAXIN  Take 1 tablet by mouth 3 (Three) Times a Day As Needed for Muscle Spasms.               Where to Get Your Medications        These medications were sent to [facility] [id number] - [place], [place] - [address] - [phone]  - [phone]  [address] [place] [address]      Phone: [phone]   methocarbamol 750 MG tablet            Fawad García MD  08/17/24 0370

## 2024-08-17 NOTE — DISCHARGE INSTRUCTIONS
Follow-up with your primary care as well as the [facility], call [phone] to schedule appointment.  Return for increased pain, numbness, weakness, bowel or bladder dysfunction, fever or any other concerns.  Gentle stretching, deep tissue massage, warm compress

## 2024-08-19 ENCOUNTER — HOSPITAL ENCOUNTER (EMERGENCY)
Facility: HOSPITAL | Age: 64
Discharge: HOME | End: 2024-08-19
Attending: EMERGENCY MEDICINE | Admitting: EMERGENCY MEDICINE
Payer: COMMERCIAL

## 2024-08-19 VITALS
BODY MASS INDEX: 28.73 KG/M2 | OXYGEN SATURATION: 96 % | HEIGHT: 68 IN | WEIGHT: 189.6 LBS | TEMPERATURE: 97.8 F | DIASTOLIC BLOOD PRESSURE: 71 MMHG | HEART RATE: 89 BPM | SYSTOLIC BLOOD PRESSURE: 124 MMHG | RESPIRATION RATE: 18 BRPM

## 2024-08-19 DIAGNOSIS — K52.9 GASTROENTERITIS: Primary | ICD-10-CM

## 2024-08-19 LAB
ALBUMIN SERPL-MCNC: 3.7 G/DL (ref 3.5–5.2)
ALBUMIN/GLOB SERPL: 1.2 G/DL
ALP SERPL-CCNC: 100 U/L (ref 39–117)
ALT SERPL W P-5'-P-CCNC: 16 U/L (ref 1–33)
ANION GAP SERPL CALCULATED.3IONS-SCNC: 13 MMOL/L (ref 5–15)
AST SERPL-CCNC: 21 U/L (ref 1–32)
BASOPHILS # BLD AUTO: 0.08 10*3/MM3 (ref 0–0.2)
BASOPHILS NFR BLD AUTO: 0.5 % (ref 0–1.5)
BILIRUB SERPL-MCNC: 0.3 MG/DL (ref 0–1.2)
BUN SERPL-MCNC: 23 MG/DL (ref 8–23)
BUN/CREAT SERPL: 25.3 (ref 7–25)
CALCIUM SPEC-SCNC: 8.9 MG/DL (ref 8.6–10.5)
CHLORIDE SERPL-SCNC: 107 MMOL/L (ref 98–107)
CO2 SERPL-SCNC: 20 MMOL/L (ref 22–29)
CREAT SERPL-MCNC: 0.91 MG/DL (ref 0.57–1)
D-LACTATE SERPL-SCNC: 2 MMOL/L (ref 0.3–2)
DEPRECATED RDW RBC AUTO: 46.9 FL (ref 37–54)
EGFRCR SERPLBLD CKD-EPI 2021: 71 ML/MIN/1.73
EOSINOPHIL # BLD AUTO: 0.07 10*3/MM3 (ref 0–0.4)
EOSINOPHIL NFR BLD AUTO: 0.4 % (ref 0.3–6.2)
ERYTHROCYTE [DISTWIDTH] IN BLOOD BY AUTOMATED COUNT: 14.3 % (ref 12.3–15.4)
GLOBULIN UR ELPH-MCNC: 3 GM/DL
GLUCOSE SERPL-MCNC: 253 MG/DL (ref 65–99)
HCT VFR BLD AUTO: 50.4 % (ref 34–46.6)
HGB BLD-MCNC: 15.8 G/DL (ref 12–15.9)
IMM GRANULOCYTES # BLD AUTO: 0.13 10*3/MM3 (ref 0–0.05)
IMM GRANULOCYTES NFR BLD AUTO: 0.8 % (ref 0–0.5)
LYMPHOCYTES # BLD AUTO: 1.66 10*3/MM3 (ref 0.7–3.1)
LYMPHOCYTES NFR BLD AUTO: 9.7 % (ref 19.6–45.3)
MCH RBC QN AUTO: 28.1 PG (ref 26.6–33)
MCHC RBC AUTO-ENTMCNC: 31.3 G/DL (ref 31.5–35.7)
MCV RBC AUTO: 89.7 FL (ref 79–97)
MONOCYTES # BLD AUTO: 1.1 10*3/MM3 (ref 0.1–0.9)
MONOCYTES NFR BLD AUTO: 6.4 % (ref 5–12)
NEUTROPHILS NFR BLD AUTO: 14.03 10*3/MM3 (ref 1.7–7)
NEUTROPHILS NFR BLD AUTO: 82.2 % (ref 42.7–76)
NRBC BLD AUTO-RTO: 0 /100 WBC (ref 0–0.2)
PLATELET # BLD AUTO: 309 10*3/MM3 (ref 140–450)
PMV BLD AUTO: 9.5 FL (ref 6–12)
POTASSIUM SERPL-SCNC: 5 MMOL/L (ref 3.5–5.2)
PROT SERPL-MCNC: 6.7 G/DL (ref 6–8.5)
RBC # BLD AUTO: 5.62 10*6/MM3 (ref 3.77–5.28)
SODIUM SERPL-SCNC: 140 MMOL/L (ref 136–145)
TROPONIN T SERPL HS-MCNC: 8 NG/L
WBC NRBC COR # BLD AUTO: 17.07 10*3/MM3 (ref 3.4–10.8)

## 2024-08-19 PROCEDURE — 36415 COLL VENOUS BLD VENIPUNCTURE: CPT

## 2024-08-19 PROCEDURE — 99283 EMERGENCY DEPT VISIT LOW MDM: CPT

## 2024-08-19 PROCEDURE — 93005 ELECTROCARDIOGRAM TRACING: CPT

## 2024-08-19 PROCEDURE — 96374 THER/PROPH/DIAG INJ IV PUSH: CPT

## 2024-08-19 PROCEDURE — 84484 ASSAY OF TROPONIN QUANT: CPT | Performed by: EMERGENCY MEDICINE

## 2024-08-19 PROCEDURE — 25810000003 SODIUM CHLORIDE 0.9 % SOLUTION: Performed by: EMERGENCY MEDICINE

## 2024-08-19 PROCEDURE — 83605 ASSAY OF LACTIC ACID: CPT | Performed by: EMERGENCY MEDICINE

## 2024-08-19 PROCEDURE — 25010000002 ONDANSETRON PER 1 MG: Performed by: EMERGENCY MEDICINE

## 2024-08-19 PROCEDURE — 85025 COMPLETE CBC W/AUTO DIFF WBC: CPT | Performed by: EMERGENCY MEDICINE

## 2024-08-19 PROCEDURE — 96375 TX/PRO/DX INJ NEW DRUG ADDON: CPT

## 2024-08-19 PROCEDURE — 87040 BLOOD CULTURE FOR BACTERIA: CPT | Performed by: EMERGENCY MEDICINE

## 2024-08-19 PROCEDURE — 25010000002 METOCLOPRAMIDE PER 10 MG: Performed by: EMERGENCY MEDICINE

## 2024-08-19 PROCEDURE — 80053 COMPREHEN METABOLIC PANEL: CPT | Performed by: EMERGENCY MEDICINE

## 2024-08-19 PROCEDURE — 93005 ELECTROCARDIOGRAM TRACING: CPT | Performed by: EMERGENCY MEDICINE

## 2024-08-19 RX ORDER — METOCLOPRAMIDE 10 MG/1
10 TABLET ORAL 3 TIMES DAILY PRN
Qty: 21 TABLET | Refills: 0 | Status: SHIPPED | OUTPATIENT
Start: 2024-08-19

## 2024-08-19 RX ORDER — ONDANSETRON 2 MG/ML
4 INJECTION INTRAMUSCULAR; INTRAVENOUS ONCE
Status: COMPLETED | OUTPATIENT
Start: 2024-08-19 | End: 2024-08-19

## 2024-08-19 RX ORDER — SODIUM CHLORIDE 0.9 % (FLUSH) 0.9 %
10 SYRINGE (ML) INJECTION AS NEEDED
Status: DISCONTINUED | OUTPATIENT
Start: 2024-08-19 | End: 2024-08-19 | Stop reason: HOSPADM

## 2024-08-19 RX ORDER — ONDANSETRON 4 MG/1
4 TABLET, ORALLY DISINTEGRATING ORAL EVERY 8 HOURS PRN
Qty: 20 TABLET | Refills: 0 | Status: SHIPPED | OUTPATIENT
Start: 2024-08-19 | End: 2024-08-19 | Stop reason: ALTCHOICE

## 2024-08-19 RX ORDER — METOCLOPRAMIDE HYDROCHLORIDE 5 MG/ML
10 INJECTION INTRAMUSCULAR; INTRAVENOUS ONCE
Status: COMPLETED | OUTPATIENT
Start: 2024-08-19 | End: 2024-08-19

## 2024-08-19 RX ADMIN — SODIUM CHLORIDE 1000 ML: 9 INJECTION, SOLUTION INTRAVENOUS at 03:45

## 2024-08-19 RX ADMIN — ONDANSETRON 4 MG: 2 INJECTION INTRAMUSCULAR; INTRAVENOUS at 03:45

## 2024-08-19 RX ADMIN — METOCLOPRAMIDE 10 MG: 5 INJECTION, SOLUTION INTRAMUSCULAR; INTRAVENOUS at 06:36

## 2024-08-19 NOTE — ED PROVIDER NOTES
Subjective   History of Present Illness  64-year-old female with 4 hours of nonbloody vomiting and diarrhea with some abdominal cramping, patient also had some palpitations that have resolved when she had anxiety.  No chest pain.      Review of Systems   Cardiovascular:  Positive for palpitations.   Gastrointestinal:  Positive for abdominal pain, diarrhea, nausea and vomiting.       Past Medical History:   Diagnosis Date    Back pain 03/10/2017    low-area    Disease of thyroid gland     Fibromyalgia     Lumbar and sacral spondylarthritis 03/10/2017    acquired     Spondylosis 03/10/2017    lumbar with radiculopathy       Allergies   Allergen Reactions    Stadol [Butorphanol] Hives       Past Surgical History:   Procedure Laterality Date     SECTION      GALLBLADDER SURGERY      HYSTERECTOMY      LAPAROSCOPIC GASTRIC BANDING      STOMACH SURGERY      Tummy Tuck    THYROID SURGERY         Family History   Problem Relation Age of Onset    Deep vein thrombosis Mother     Snoring Mother     Breast cancer Mother     Heart disease Father     Restless legs syndrome Father     Snoring Father     Breast cancer Sister     Breast cancer Maternal Aunt        Social History     Socioeconomic History    Marital status: Single           Objective   Physical Exam  Constitutional:       Appearance: Normal appearance.   HENT:      Head: Normocephalic and atraumatic.      Mouth/Throat:      Mouth: Mucous membranes are moist.      Pharynx: Oropharynx is clear.   Cardiovascular:      Rate and Rhythm: Normal rate and regular rhythm.      Heart sounds: Normal heart sounds.   Pulmonary:      Effort: Pulmonary effort is normal.      Breath sounds: Normal breath sounds.   Abdominal:      General: Bowel sounds are normal. There is no distension.      Palpations: Abdomen is soft.      Tenderness: There is no abdominal tenderness.   Skin:     General: Skin is warm and dry.      Capillary Refill: Capillary refill  takes less than 2 seconds.   Neurological:      General: No focal deficit present.      Mental Status: She is alert and oriented to person, place, and time.   Psychiatric:         Mood and Affect: Mood normal.         Behavior: Behavior normal.         Procedures           ED Course                                             Medical Decision Making  Patient well on reevaluation, symptoms resolved, desires to go home, no abdominal pain on repeat evaluation return precautions reviewed.    Problems Addressed:  Gastroenteritis: complicated acute illness or injury    Amount and/or Complexity of Data Reviewed  Labs: ordered.  ECG/medicine tests: ordered and independent interpretation performed.     Details: EKG interpretation: Sinus tachycardia, rate 100, right bundle branch block, no STEMI    Risk  Prescription drug management.        Final diagnoses:   Gastroenteritis       ED Disposition  ED Disposition       ED Disposition   Discharge    Condition   Stable    Comment   --               Liborio Hobson MD  [address] [place] [address] [phone]      As needed         Medication List        New Prescriptions      metoclopramide 10 MG tablet  Commonly known as: REGLAN  Take 1 tablet by mouth 3 (Three) Times a Day As Needed (nausea).               Where to Get Your Medications        These medications were sent to [facility] [id number] - [place], [place] - [address] [facility] - [phone]  - [phone] FX  [address] [facility] [place] [address]      Phone: [phone]   metoclopramide 10 MG tablet            Jr Mejía MD  08/20/24 8554

## 2024-08-20 LAB
QT INTERVAL: 384 MS
QTC INTERVAL: 496 MS

## 2024-08-24 LAB
BACTERIA SPEC AEROBE CULT: NORMAL
BACTERIA SPEC AEROBE CULT: NORMAL